# Patient Record
Sex: MALE | Race: ASIAN | NOT HISPANIC OR LATINO | URBAN - METROPOLITAN AREA
[De-identification: names, ages, dates, MRNs, and addresses within clinical notes are randomized per-mention and may not be internally consistent; named-entity substitution may affect disease eponyms.]

---

## 2017-09-13 ENCOUNTER — OFFICE VISIT - HEALTHEAST (OUTPATIENT)
Dept: FAMILY MEDICINE | Facility: CLINIC | Age: 27
End: 2017-09-13

## 2017-09-13 DIAGNOSIS — R10.9 ABDOMINAL PAIN: ICD-10-CM

## 2017-09-18 ENCOUNTER — AMBULATORY - HEALTHEAST (OUTPATIENT)
Dept: FAMILY MEDICINE | Facility: CLINIC | Age: 27
End: 2017-09-18

## 2017-09-18 ENCOUNTER — AMBULATORY - HEALTHEAST (OUTPATIENT)
Dept: LAB | Facility: CLINIC | Age: 27
End: 2017-09-18

## 2017-09-18 DIAGNOSIS — R19.7 DIARRHEA OF PRESUMED INFECTIOUS ORIGIN: ICD-10-CM

## 2017-10-13 ENCOUNTER — RECORDS - HEALTHEAST (OUTPATIENT)
Dept: ADMINISTRATIVE | Facility: OTHER | Age: 27
End: 2017-10-13

## 2017-10-24 ENCOUNTER — RECORDS - HEALTHEAST (OUTPATIENT)
Dept: ADMINISTRATIVE | Facility: OTHER | Age: 27
End: 2017-10-24

## 2017-11-14 ENCOUNTER — AMBULATORY - HEALTHEAST (OUTPATIENT)
Dept: NURSING | Facility: CLINIC | Age: 27
End: 2017-11-14

## 2017-11-14 ENCOUNTER — AMBULATORY - HEALTHEAST (OUTPATIENT)
Dept: FAMILY MEDICINE | Facility: CLINIC | Age: 27
End: 2017-11-14

## 2017-11-14 DIAGNOSIS — Z23 NEED FOR IMMUNIZATION AGAINST INFLUENZA: ICD-10-CM

## 2018-04-16 ENCOUNTER — AMBULATORY - HEALTHEAST (OUTPATIENT)
Dept: FAMILY MEDICINE | Facility: CLINIC | Age: 28
End: 2018-04-16

## 2018-04-16 DIAGNOSIS — F81.9 LEARNING DISABILITY: ICD-10-CM

## 2018-09-25 ENCOUNTER — OFFICE VISIT - HEALTHEAST (OUTPATIENT)
Dept: FAMILY MEDICINE | Facility: CLINIC | Age: 28
End: 2018-09-25

## 2018-09-25 DIAGNOSIS — R12 HEARTBURN: ICD-10-CM

## 2018-09-25 DIAGNOSIS — R10.9 ABDOMINAL PAIN: ICD-10-CM

## 2018-09-25 DIAGNOSIS — M54.9 MID-BACK PAIN, ACUTE: ICD-10-CM

## 2018-09-25 RX ORDER — IBUPROFEN 800 MG/1
800 TABLET, FILM COATED ORAL EVERY 8 HOURS PRN
Qty: 30 TABLET | Refills: 1 | Status: SHIPPED | OUTPATIENT
Start: 2018-09-25

## 2021-05-31 VITALS — BODY MASS INDEX: 24.11 KG/M2 | WEIGHT: 135 LBS

## 2021-06-02 VITALS — WEIGHT: 139 LBS | BODY MASS INDEX: 24.82 KG/M2

## 2021-06-13 NOTE — PROGRESS NOTES
Subjective:  27 y.o. male with concerns of epigastric stomach pain for about a month.  Patient notes pain is epigastric and does not radiate.  Describes as achy pain.  Worse with spicy food sometimes he has pain without eating however.  Denies nausea vomiting constipation diarrhea blood in stool or black tardiness.  Previously took medicine but does not know what it was.    Social history:  In [facility] and attended a clinic there but is not sure which clinic.  Immigrant to the [place] from [place].  No alcohol or tobacco use.    Past medical history:  Abdominal surgery around the age of 12.  Patient has midline scar from umbilicus to pubis.  He does not know what the surgery was for.    He signed an [facility] for [facility] in [facility]    No outpatient prescriptions prior to visit.     No facility-administered medications prior to visit.       History   Smoking Status     Never Smoker   Smokeless Tobacco     Never Used      Objective:  /64 (Patient Site: Left Arm, Patient Position: Sitting, Cuff Size: Adult Regular)  Pulse 80  Temp 98.6  F (37  C) (Oral)   Resp 16  Wt 135 lb (61.2 kg)  BMI 24.11 kg/m2  GENERAL: alert, not distressed  CHEST: clear, no rales, rhonchi, or wheezes  CARDIAC: regular without murmur  ABDOMEN:  Midline scar as above.  soft, non tender, non distended, normal bowel sounds    Assessment and Plan:   1. Abdominal pain  ? Hx of meckles?  We may never know.  Would start PPI and ask GI to evaluate.  - omeprazole (PRILOSEC) 20 MG capsule; Take 1 capsule (20 mg total) by mouth daily.  Dispense: 90 capsule; Refill: 1  - Ambulatory referral to Gastroenterology     This visit was conducted with the aid of a professional .

## 2021-06-14 NOTE — PROGRESS NOTES
Patient came in for a flu shot. Patient tolerated well.    Hawa Brown, [facility]

## 2021-06-16 PROBLEM — R12 HEARTBURN: Status: ACTIVE | Noted: 2017-10-13

## 2021-06-16 PROBLEM — F81.9 LEARNING DISABILITY: Status: ACTIVE | Noted: 2018-04-16

## 2021-06-16 PROBLEM — B18.1 CHRONIC VIRAL HEPATITIS B (H): Status: ACTIVE | Noted: 2017-09-23

## 2021-06-20 NOTE — PROGRESS NOTES
Chief Complaint   Patient presents with     Back Pain     x 2 weeks, hurt at work, position is a machine, upper back      Subjective:  28 y.o. male with concerns as above.  Works as a .  Does not recall an injury.  Does do fairly significant labor at work.  Back pain is in the middle between scapula.  Mostly right but sometimes goes to the left as well.  Not necessarily midline.  Tends to get worse if he is sitting in one position for a long time such as driving.  Better if continues to be moving.  He is taking no medications to help it.  Does not take a PPI despite med list.  Denies fevers or chills.  No weakness or numbness in the arms.    Pain does seem to be exacerbated with flexion and extension.  Does not seem to be worsened with twisting.    Continues to have some abdominal pain.  He saw [facility].  An H. pylori breath test was negative in October 2017.  They prescribe MiraLAX.  He continues to relate to pain in his surgical scar.  No nausea or vomiting.  No diarrhea.  He may have some constipation.    Outpatient Medications Prior to Visit   Medication Sig Dispense Refill     omeprazole (PRILOSEC) 20 MG capsule Take 1 capsule (20 mg total) by mouth daily. (Patient not taking: Reported on 9/25/2018) 90 capsule 1     No facility-administered medications prior to visit.       History   Smoking Status     Never Smoker   Smokeless Tobacco     Never Used      Objective:  /74  Pulse 76  Wt 139 lb (63 kg)  SpO2 98%  BMI 24.82 kg/m2  GENERAL: alert, not distressed  EYES: PERRL/EOMI, no scleral icterus, no conjunctival injection   CHEST: clear, no rales, rhonchi, or wheezes  CARDIAC: regular without murmur, gallop, or rub  ABDOMEN: soft, non tender, non distended, normal bowel sounds   Midline scar from umbilicus to pubis.  MS: Normal muscle tone, bulk, and strength in upper extremities.  BACK: Tenderness in the rhomboid area.  No midline tenderness of the thoracic spine.    Assessment and Plan:    1. Mid-back pain, acute  Seems acute and mechanical.  Discussed PT.  He declined,  Wants medication for not.  NSAIDs discussed.  Since he has been off PPI and still has the same pain I think he can safely give a short trial of NSAIDs.  Discussed gastrointestinal symptoms to watch for.  - ibuprofen (ADVIL,MOTRIN) 800 MG tablet; Take 1 tablet (800 mg total) by mouth every 8 (eight) hours as needed for pain.  Dispense: 30 tablet; Refill: 1    2. Abdominal pain  I did recommend he restart the PPI.  - omeprazole (PRILOSEC) 20 MG capsule; Take 1 capsule (20 mg total) by mouth daily.  Dispense: 90 capsule; Refill: 1    3. Heartburn